# Patient Record
Sex: FEMALE | NOT HISPANIC OR LATINO | URBAN - METROPOLITAN AREA
[De-identification: names, ages, dates, MRNs, and addresses within clinical notes are randomized per-mention and may not be internally consistent; named-entity substitution may affect disease eponyms.]

---

## 2024-01-24 ENCOUNTER — CARE COORDINATION (OUTPATIENT)
Dept: FAMILY MEDICINE | Facility: CLINIC | Age: 19
End: 2024-01-24
Payer: COMMERCIAL

## 2024-01-24 NOTE — PROGRESS NOTES
Late Enty 12- ( before profile was built)    Patient was contacted on 12- to verify phone number and address. Information current above. Patient speaks English. Pt stated that on 12/20/2023 thru middle of January 2024 she will be out of town. She asks that appointments be made for when she gets back to town. Patient has been made aware of the pending appointments. [employer]  offered assistance with transportation. Patient states that she will call the customer service line on [employer] card to request for medical ride.        Today 01-  Writer made phone call to Pt to remind about the initial and result appointments. Pt will work on getting relative to bring her to initial appt. We will work on requesting result appt ride thru [facility] tomorrow.    Refugee Coordination  **Document in each family member's chart & notate transportation in Appt Note**    Initial Screening        Appointment Date: 01/25/2024 8am lab & pcp    Total family members: 1    Appointment & Address confirmed with Patient: Yes      Date Confirmed: 12- see above     Transportation Provider: Pt will have relative bring her  Roundtrip?  Yes    Confirmed with Transportation: Yes    Date Confirmed: 12/19/2023 and 01/24/2024    DHS Worker & Phone number: Adrianne Gia   Date notified: 121/20/2023    -----------------------------------------------------------------------    Result Appointment:    Appointment Date: 02/01/2024 8am  Total family members: 1    Appointment & Address confirmed with Patient: Yes      Date Confirmed: 01/24/2024    Transportation Provider: Writer will teach Pt how to request thru [facility] at 01/25 /24 initial appt. Pt speaks English.    Roundtrip?  Yes  Confirmed with Transportation: Yes    Date Confirmed: 01/24/2024     DHS Worker & Phone number: Adriannekeena Nielsen   Date notified: 121/20/2023

## 2024-01-25 ENCOUNTER — CARE COORDINATION (OUTPATIENT)
Dept: FAMILY MEDICINE | Facility: CLINIC | Age: 19
End: 2024-01-25

## 2024-01-25 ENCOUNTER — OFFICE VISIT (OUTPATIENT)
Dept: FAMILY MEDICINE | Facility: CLINIC | Age: 19
End: 2024-01-25
Payer: COMMERCIAL

## 2024-01-25 VITALS
HEIGHT: 64 IN | SYSTOLIC BLOOD PRESSURE: 117 MMHG | RESPIRATION RATE: 20 BRPM | WEIGHT: 135.8 LBS | BODY MASS INDEX: 23.18 KG/M2 | OXYGEN SATURATION: 96 % | DIASTOLIC BLOOD PRESSURE: 74 MMHG | HEART RATE: 92 BPM | TEMPERATURE: 97.5 F

## 2024-01-25 DIAGNOSIS — R45.89 SADNESS: ICD-10-CM

## 2024-01-25 DIAGNOSIS — Z02.89 REFUGEE HEALTH EXAMINATION: Primary | ICD-10-CM

## 2024-01-25 LAB
ALBUMIN SERPL BCG-MCNC: 4.7 G/DL (ref 3.5–5.2)
ALP SERPL-CCNC: 70 U/L (ref 40–150)
ALT SERPL W P-5'-P-CCNC: 14 U/L (ref 0–50)
ANION GAP SERPL CALCULATED.3IONS-SCNC: 12 MMOL/L (ref 7–15)
AST SERPL W P-5'-P-CCNC: 20 U/L (ref 0–45)
BASOPHILS # BLD AUTO: 0.1 10E3/UL (ref 0–0.2)
BASOPHILS NFR BLD AUTO: 1 %
BILIRUB SERPL-MCNC: 0.8 MG/DL
BUN SERPL-MCNC: 12.4 MG/DL (ref 6–20)
CALCIUM SERPL-MCNC: 9.8 MG/DL (ref 8.6–10)
CHLORIDE SERPL-SCNC: 103 MMOL/L (ref 98–107)
CREAT SERPL-MCNC: 0.81 MG/DL (ref 0.51–0.95)
DEPRECATED HCO3 PLAS-SCNC: 25 MMOL/L (ref 22–29)
EGFRCR SERPLBLD CKD-EPI 2021: >90 ML/MIN/1.73M2
EOSINOPHIL # BLD AUTO: 0.1 10E3/UL (ref 0–0.7)
EOSINOPHIL NFR BLD AUTO: 2 %
ERYTHROCYTE [DISTWIDTH] IN BLOOD BY AUTOMATED COUNT: 11.5 % (ref 10–15)
GLUCOSE SERPL-MCNC: 96 MG/DL (ref 70–99)
HCG UR QL: NEGATIVE
HCT VFR BLD AUTO: 39.8 % (ref 35–47)
HGB BLD-MCNC: 13.9 G/DL (ref 11.7–15.7)
IMM GRANULOCYTES # BLD: 0 10E3/UL
IMM GRANULOCYTES NFR BLD: 0 %
LYMPHOCYTES # BLD AUTO: 2.7 10E3/UL (ref 0.8–5.3)
LYMPHOCYTES NFR BLD AUTO: 34 %
MCH RBC QN AUTO: 31 PG (ref 26.5–33)
MCHC RBC AUTO-ENTMCNC: 34.9 G/DL (ref 31.5–36.5)
MCV RBC AUTO: 89 FL (ref 78–100)
MONOCYTES # BLD AUTO: 0.5 10E3/UL (ref 0–1.3)
MONOCYTES NFR BLD AUTO: 6 %
NEUTROPHILS # BLD AUTO: 4.4 10E3/UL (ref 1.6–8.3)
NEUTROPHILS NFR BLD AUTO: 57 %
NRBC # BLD AUTO: 0 10E3/UL
NRBC BLD AUTO-RTO: 0 /100
PLATELET # BLD AUTO: 235 10E3/UL (ref 150–450)
POTASSIUM SERPL-SCNC: 3.8 MMOL/L (ref 3.4–5.3)
PROT SERPL-MCNC: 7.6 G/DL (ref 6.4–8.3)
RBC # BLD AUTO: 4.49 10E6/UL (ref 3.8–5.2)
SODIUM SERPL-SCNC: 140 MMOL/L (ref 135–145)
T PALLIDUM AB SER QL: NONREACTIVE
VZV IGG SER QL IA: 921.9 INDEX
VZV IGG SER QL IA: POSITIVE
WBC # BLD AUTO: 7.8 10E3/UL (ref 4–11)

## 2024-01-25 PROCEDURE — 87491 CHLMYD TRACH DNA AMP PROBE: CPT

## 2024-01-25 PROCEDURE — 87389 HIV-1 AG W/HIV-1&-2 AB AG IA: CPT

## 2024-01-25 PROCEDURE — 87340 HEPATITIS B SURFACE AG IA: CPT

## 2024-01-25 PROCEDURE — 81025 URINE PREGNANCY TEST: CPT

## 2024-01-25 PROCEDURE — 85025 COMPLETE CBC W/AUTO DIFF WBC: CPT

## 2024-01-25 PROCEDURE — 80053 COMPREHEN METABOLIC PANEL: CPT

## 2024-01-25 PROCEDURE — 86780 TREPONEMA PALLIDUM: CPT

## 2024-01-25 PROCEDURE — 86704 HEP B CORE ANTIBODY TOTAL: CPT

## 2024-01-25 PROCEDURE — 36415 COLL VENOUS BLD VENIPUNCTURE: CPT

## 2024-01-25 PROCEDURE — 99385 PREV VISIT NEW AGE 18-39: CPT | Mod: GC

## 2024-01-25 PROCEDURE — 86682 HELMINTH ANTIBODY: CPT | Mod: 90

## 2024-01-25 PROCEDURE — 99000 SPECIMEN HANDLING OFFICE-LAB: CPT

## 2024-01-25 PROCEDURE — 86706 HEP B SURFACE ANTIBODY: CPT

## 2024-01-25 PROCEDURE — 87591 N.GONORRHOEAE DNA AMP PROB: CPT

## 2024-01-25 PROCEDURE — 86803 HEPATITIS C AB TEST: CPT

## 2024-01-25 PROCEDURE — 86787 VARICELLA-ZOSTER ANTIBODY: CPT

## 2024-01-25 PROCEDURE — 86481 TB AG RESPONSE T-CELL SUSP: CPT

## 2024-01-25 NOTE — PATIENT INSTRUCTIONS
Thank you for coming in to see us today!    - Follow up in 1 week to discuss labs    Matheus Lam, DO

## 2024-01-25 NOTE — PROGRESS NOTES
Preceptor Attestation:   Patient seen, evaluated and discussed with the resident. I have verified the content of the note, which accurately reflects my assessment of the patient and the plan of care.   Supervising Physician:  Chidi Schrader MD
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
## 2024-01-25 NOTE — PROGRESS NOTES
Writer saw Pt today. Writer also welcomed [place] to the [place]. Provide audreyr's business card for [employer]. Writer asked Pt how she is adjusting to the [place]. Pt teared up. Pt reports that she is here alone from [place]. That her family is still there. Pt manages to talk about her wish that some day that she would like to bring her family to be with her. Pt stated that she just does not know how to bring them. Writer offered that when she is ready to work on that piece we can explore that together. Pt is tearful. Writer explained about our behavioral health specialist that can help her if she needs to talk about her feelings and concerns. She will let writer know or make an appointment herself.     Pt pulled herself together and managed a smile and steady eye contact with writer. Pt stated that she is very busy going to college. Pt states that she has obtained her driving permit. She is continuing to work on driving skills. Pt says that for now she will use the bus system to get to places and school. Writer offered Pt a community resource for use in future days. Writer showed Pt how to look up the website for [employer] [facility] where they help with housing, energy assistance, money, jobs and transportation and a car loan program. See link https://[employer]enaissance [employer].org/services/.    We went over the [employer] Customer Service number to request for rides to medical appointments. Pt showed that she has been using that number to obtain informations. Writer confirmed that that is the number to call for ride request. Explained to Pt that [facility] require 2 days or more notice to schedule . Pt states that she will call [facility] for the next appointment.

## 2024-01-25 NOTE — COMMUNITY RESOURCES LIST (ENGLISH)
01/25/2024   [facility]  N/A  For questions about this resource list or additional care needs, please contact your primary care clinic or care manager.  Phone: [phone]   Email: N/A   Address: [address] [place] [id number]   Hours: N/A        Food and Nutrition       Food pantry  1  [facility] Food Shelf - Food Shelf Distance: 1.58 miles      [facility] [place] [address]  Language: English, Congolese  Hours: Mon - Tue 2:00 PM - 7:00 PM , Wed 11:00 AM - 2:00 PM , Fri 11:00 AM - 2:00 PM  Fees: Free   Phone: (391) 661-3142 Email: info@[employer]abi.org Website: http://navabi.org     2  [facility] Resource Center Distance: 3.47 miles      In-Person   [address] [place] [address]  Language: English, Congolese  Hours: Mon - Thu 9:30 AM - 3:00 PM  Fees: Free   Phone: (386) 846-1245 Email: center@[facility] Website: https://www.[facility].[facility]/community-resource-center/     SNAP application assistance  3  [employer] Distance: 9.64 miles      In-Person   [address] [place] [address]  Language: English, Congolese  Hours: Mon - Fri 8:30 AM - 5:00 PM  Fees: Free   Phone: (680) 449-7953 Email: info@clues.org Website: http://www.clues.org     4  [place] Department of Human Services - [employer] (SNAP) Distance: 10.57 miles      Phone/Virtual   [address] [address] [place] [address]  Language: English, Hmong, Angolan, Cymro, Congolese, Syriac  Hours: Mon - Fri 9:00 AM - 5:00 PM  Fees: Free   Phone: (667) 763-6603 Website: https://[place].gov/dhs/people-we-serve/adults/economic-assistance/food-nutrition/programs-and-services/supplemental-nutrition-assistance-program.jsp     Soup kitchen or free meals  5  [facility] - Thursday Night Community Meal Distance: 3.47 miles      In-Person   [address] [place] [address]  Language: English, Hebrew  Hours: Thu 6:00 PM - 7:00 PM  Fees: Free   Phone: (979) 389-4245 Email: center@[employer] Website: https://www.[employer]/[facility]-resource-center/     6  [facility] [employer] - Loaves [employer] Fishes Distance: 7.29 miles      Pickup   [address] [place] [address]  Language: English  Hours: Wed 5:30 PM - 6:30 PM  Fees: Free   Phone: (140) 384-8164 Email: office@[employer] Website: https://www.[facility]          Transportation       Free or low-cost transportation  7  [facility] Bus [facility] - Free or low-cost transportation Distance: 3.2 miles      In-Person   [address] [place] [address]  Language: English  Hours: Mon - Fri 9:00 AM - 5:00 PM  Fees: Free   Phone: (441) 283-2059 Email: info@[employer] Website: https://www.[employer]/     8  [facility] - Free Bus and Gas Cards - Free or low-cost transportation Distance: 4.19 miles      Pickup   [address] [place] [address]  Language: English  Hours: Mon - Fri 9:00 AM - 4:00 PM , Sun 9:30 AM - 12:00 PM  Fees: Free   Phone: (286) 509-1225 Email: jesse@[facility].org Website: http://[facility].org/[facility]/[place]/     Transportation to medical appointments  9  [employer] Ride Distance: 7.65 miles      In-Person   [address] [address]  Language: English  Hours: Mon - Thu 6:00 AM - 6:00 PM , Fri 6:00 AM - 5:00 PM  Fees: Insurance, Self Pay   Phone: (379) 949-5783 Email: office@[employer] Website: https://www.[employer]/     10  [facility] - Non-Emergency Medical Transportation Distance: 11.69 miles      In-Person   [address] [place] [address]  Language: English  Hours: Mon - Fri 8:00 AM - 4:00 PM Appt. Only  Fees: Self Pay   Phone: (458) 398-8880 Website:  http://www.[facility].org/Medical-Services/Emergency-medical-services/Non-emergency-transportation/          Important Numbers & Websites       Emergency Services   911  [facility]   311  Poison Control   (455) 144-1103  Suicide Prevention Lifeline   (541) 122-4772 (TALK)  Child Abuse Hotline   (686) 936-9184 (4-A-Child)  Sexual Assault Hotline   (190) 269-4755 (HOPE)  National Runaway Safeline   (474) 935-9232 (RUNAWAY)  All-Options Talkline   (824) 819-2085  Substance Abuse Referral   (394) 422-6386 (HELP)

## 2024-01-26 LAB
C TRACH DNA SPEC QL NAA+PROBE: NEGATIVE
GAMMA INTERFERON BACKGROUND BLD IA-ACNC: 0.01 IU/ML
HBV CORE AB SERPL QL IA: NONREACTIVE
HBV SURFACE AB SERPL IA-ACNC: <3.5 M[IU]/ML
HBV SURFACE AB SERPL IA-ACNC: NONREACTIVE M[IU]/ML
HBV SURFACE AG SERPL QL IA: NONREACTIVE
HCV AB SERPL QL IA: NONREACTIVE
HIV 1+2 AB+HIV1 P24 AG SERPL QL IA: NONREACTIVE
M TB IFN-G BLD-IMP: NEGATIVE
M TB IFN-G CD4+ BCKGRND COR BLD-ACNC: 9.99 IU/ML
MITOGEN IGNF BCKGRD COR BLD-ACNC: 0.02 IU/ML
MITOGEN IGNF BCKGRD COR BLD-ACNC: 0.02 IU/ML
N GONORRHOEA DNA SPEC QL NAA+PROBE: NEGATIVE
QUANTIFERON MITOGEN: 10 IU/ML
QUANTIFERON NIL TUBE: 0.01 IU/ML
QUANTIFERON TB1 TUBE: 0.03 IU/ML
QUANTIFERON TB2 TUBE: 0.03

## 2024-01-28 LAB — STRONGYLOIDES IGG SER IA-ACNC: 0.2 IV

## 2024-01-30 LAB — SCHISTOSOMA IGG SER IA-ACNC: 4 U

## 2024-02-01 ENCOUNTER — TELEPHONE (OUTPATIENT)
Dept: FAMILY MEDICINE | Facility: CLINIC | Age: 19
End: 2024-02-01

## 2024-02-01 NOTE — TELEPHONE ENCOUNTER
At 8:10am Pt did not show to her 8am Refugee Result appt.Writer called Pt but no answer on the number listed. Writer YAZMIN on  for a call back stating that we can definitely schedule a new appt and writer will be happy to help Pt schedule a ride this next time.

## 2024-02-06 ENCOUNTER — TELEPHONE (OUTPATIENT)
Dept: FAMILY MEDICINE | Facility: CLINIC | Age: 19
End: 2024-02-06
Payer: COMMERCIAL

## 2024-02-06 NOTE — TELEPHONE ENCOUNTER
This writer called Pt to offer her assistance in calling [facility] for ride. Pt declines saying she has someone to drive her to the Feb 8th 2:10pm appointment at [facility]. Emailed Adrianne Nielsen at [facility] to inform of new result appt.

## 2024-02-08 ENCOUNTER — OFFICE VISIT (OUTPATIENT)
Dept: FAMILY MEDICINE | Facility: CLINIC | Age: 19
End: 2024-02-08
Payer: COMMERCIAL

## 2024-02-08 VITALS
HEART RATE: 69 BPM | RESPIRATION RATE: 18 BRPM | WEIGHT: 138.8 LBS | HEIGHT: 64 IN | BODY MASS INDEX: 23.7 KG/M2 | TEMPERATURE: 98.2 F | DIASTOLIC BLOOD PRESSURE: 70 MMHG | SYSTOLIC BLOOD PRESSURE: 109 MMHG | OXYGEN SATURATION: 93 %

## 2024-02-08 DIAGNOSIS — Z02.89 REFUGEE HEALTH EXAMINATION: Primary | ICD-10-CM

## 2024-02-08 DIAGNOSIS — Z30.45 ENCOUNTER FOR SURVEILLANCE OF TRANSDERMAL PATCH HORMONAL CONTRACEPTIVE DEVICE: ICD-10-CM

## 2024-02-08 PROCEDURE — 90471 IMMUNIZATION ADMIN: CPT

## 2024-02-08 PROCEDURE — 90651 9VHPV VACCINE 2/3 DOSE IM: CPT

## 2024-02-08 PROCEDURE — 99213 OFFICE O/P EST LOW 20 MIN: CPT | Mod: 25

## 2024-02-08 RX ORDER — NORELGESTROMIN AND ETHINYL ESTRADIOL 35; 150 UG/MG; UG/MG
1 PATCH TRANSDERMAL
COMMUNITY
Start: 2023-12-08 | End: 2024-02-08

## 2024-02-08 RX ORDER — NORELGESTROMIN AND ETHINYL ESTRADIOL 35; 150 UG/MG; UG/MG
1 PATCH TRANSDERMAL WEEKLY
Qty: 3 PATCH | Refills: 11 | Status: SHIPPED | OUTPATIENT
Start: 2024-02-08

## 2024-02-08 NOTE — PROGRESS NOTES
"Preceptor Attestation:  Vitals:    02/08/24 1412   BP: 109/70   BP Location: Right arm   Patient Position: Sitting   Cuff Size: Adult Regular   Pulse: 69   Resp: 18   Temp: 98.2  F (36.8  C)   TempSrc: Oral   SpO2: 93%   Weight: 63 kg (138 lb 12.8 oz)   Height: 1.618 m (5' 3.7\")          I discussed the patient with the resident and evaluated the patient in person. I have verified the content of the note, which accurately reflects my assessment of the patient and the plan of care.   Supervising Physician:  Deanna Mcgraw MD    "

## 2024-02-08 NOTE — LETTER
[facility] [address] [place] [address]  Phone: [phone]  Fax: [phone]    February 8, 2024        Gayatri Tamayo  [address]   [facility] [address]          To whom it may concern:    RE: Gayatri Tamayo    Here are Gayatri's lab results from 1/25/2024:      Office Visit on 01/25/2024   Component Date Value Ref Range Status    Sodium 01/25/2024 140  135 - 145 mmol/L Final    Potassium 01/25/2024 3.8  3.4 - 5.3 mmol/L Final    Carbon Dioxide (CO2) 01/25/2024 25  22 - 29 mmol/L Final    Anion Gap 01/25/2024 12  7 - 15 mmol/L Final    Urea Nitrogen 01/25/2024 12.4  6.0 - 20.0 mg/dL Final    Creatinine 01/25/2024 0.81  0.51 - 0.95 mg/dL Final    GFR Estimate 01/25/2024 >90  >60 mL/min/1.73m2 Final    Calcium 01/25/2024 9.8  8.6 - 10.0 mg/dL Final    Chloride 01/25/2024 103  98 - 107 mmol/L Final    Glucose 01/25/2024 96  70 - 99 mg/dL Final    Alkaline Phosphatase 01/25/2024 70  40 - 150 U/L Final    AST 01/25/2024 20  0 - 45 U/L Final    ALT 01/25/2024 14  0 - 50 U/L Final    Protein Total 01/25/2024 7.6  6.4 - 8.3 g/dL Final    Albumin 01/25/2024 4.7  3.5 - 5.2 g/dL Final    Bilirubin Total 01/25/2024 0.8  <=1.2 mg/dL Final    Treponema Antibody Total 01/25/2024 Nonreactive  Nonreactive Final    Strongyloides Ramona IgG 01/25/2024 0.2  <=0.9 IV Final    Comment: INTERPRETIVE INFORMATION: Strongyloides Ab, IgG by ISAMAR      0.9 IV or less....... Negative - No significant                          level of Strongyloides IgG                          antibody detected.       1.0 IV................Equivocal - The Strongyloides IgG                           antibody result is borderline and                           therefore inconclusive. Recommend                           retesting the patient in 2-4 weeks,                          if clinically indicated.      1.1 IV or greater ... Positive - IgG antibodies to                          Strongyloides detected, which                           may suggest current or past                          infection.                           Number: [id number]    Schistosoma Antibody IgG 01/25/2024 4  <=8 U Final      Negative - No significant level of Schistosoma IgG antibody   detected.    HIV Antigen Antibody Combo 01/25/2024 Nonreactive  Nonreactive Final    Hepatitis B Core Antibody Total 01/25/2024 Nonreactive  Nonreactive Final    Hepatitis B Surface Antibody 01/25/2024 Nonreactive   Final    Hepatitis B Surface Antibody Instr* 01/25/2024 <3.50  <8.5 m[IU]/mL Final    Hepatitis C Antibody 01/25/2024 Nonreactive  Nonreactive Final    VZV Ramona IgG Instrument Value 01/25/2024 921.9  <135.0 Index Final    Varicella Zoster Antibody IgG 01/25/2024 Positive   Final    Suggests previous exposure or immunization and probable immunity.    Hepatitis B Surface Antigen 01/25/2024 Nonreactive  Nonreactive Final    Neisseria gonorrhoeae 01/25/2024 Negative  Negative Final    Chlamydia trachomatis 01/25/2024 Negative  Negative Final    hCG Urine Qualitative 01/25/2024 Negative  Negative Final    This test is for screening purposes.  Results should be interpreted along with the clinical picture.  Confirmation testing is available if warranted by ordering [id number], HCG Quantitative Pregnancy.    Quantiferon Nil Tube 01/25/2024 0.01  IU/mL Final    Quantiferon TB1 Tube 01/25/2024 0.03  IU/mL Final    Quantiferon TB2 Tube 01/25/2024 0.03   Final    Quantiferon Mitogen 01/25/2024 10.00  IU/mL Final    WBC Count 01/25/2024 7.8  4.0 - 11.0 10e3/uL Final    RBC Count 01/25/2024 4.49  3.80 - 5.20 10e6/uL Final    Hemoglobin 01/25/2024 13.9  11.7 - 15.7 g/dL Final    Hematocrit 01/25/2024 39.8  35.0 - 47.0 % Final    MCV 01/25/2024 89  78 - 100 fL Final    MCH 01/25/2024 31.0  26.5 - 33.0 pg Final    MCHC 01/25/2024 34.9  31.5 - 36.5 g/dL Final    RDW 01/25/2024 11.5  10.0 - 15.0 % Final    Platelet Count 01/25/2024 235  150 - 450 10e3/uL Final    % Neutrophils 01/25/2024  57  % Final    % Lymphocytes 01/25/2024 34  % Final    % Monocytes 01/25/2024 6  % Final    % Eosinophils 01/25/2024 2  % Final    % Basophils 01/25/2024 1  % Final    % Immature Granulocytes 01/25/2024 0  % Final    NRBCs per 100 WBC 01/25/2024 0  <1 /100 Final    Absolute Neutrophils 01/25/2024 4.4  1.6 - 8.3 10e3/uL Final    Absolute Lymphocytes 01/25/2024 2.7  0.8 - 5.3 10e3/uL Final    Absolute Monocytes 01/25/2024 0.5  0.0 - 1.3 10e3/uL Final    Absolute Eosinophils 01/25/2024 0.1  0.0 - 0.7 10e3/uL Final    Absolute Basophils 01/25/2024 0.1  0.0 - 0.2 10e3/uL Final    Absolute Immature Granulocytes 01/25/2024 0.0  <=0.4 10e3/uL Final    Absolute NRBCs 01/25/2024 0.0  10e3/uL Final    Quantiferon-TB Gold Plus 01/25/2024 Negative  Negative Final    No interferon gamma response to M.tuberculosis antigens was detected. Infection with M.tuberculosis is unlikely, however a single negative result does not exclude infection. In patients at high risk for infection, a second test should be considered in accordance with the 2017 ATS/IDSA/CDC Clinical Pract  ice Guidelines for Diagnosis of Tuberculosis in Adults and Children     TB1 Ag minus Nil Value 01/25/2024 0.02  IU/mL Final    TB2 Ag minus Nil Value 01/25/2024 0.02  IU/mL Final    Mitogen minus Nil Result 01/25/2024 9.99  IU/mL Final    Nil Result 01/25/2024 0.01  IU/mL Final       Please contact me for questions or concerns.      Sincerely,            Matheus Lam,

## 2024-02-08 NOTE — PATIENT INSTRUCTIONS
Thank you for coming in to see us today!    - Follow up in 1 year for a complete physical exam    Matheus Lam, DO

## 2024-02-08 NOTE — PROGRESS NOTES
Refugee Screening: Second Visit    Subjective: Feeling well today. No acute concerns.    Labs from Initial Refugee Screening Visit were reviewed       Office Visit on 01/25/2024   Component Date Value Ref Range Status    Sodium 01/25/2024 140  135 - 145 mmol/L Final    Reference intervals for this test were updated on 09/26/2023 to more accurately reflect our healthy population. There may be differences in the flagging of prior results with similar values performed with this method. Interpretation of those prior results can be made in the context of the updated reference intervals.     Potassium 01/25/2024 3.8  3.4 - 5.3 mmol/L Final    Carbon Dioxide (CO2) 01/25/2024 25  22 - 29 mmol/L Final    Anion Gap 01/25/2024 12  7 - 15 mmol/L Final    Urea Nitrogen 01/25/2024 12.4  6.0 - 20.0 mg/dL Final    Creatinine 01/25/2024 0.81  0.51 - 0.95 mg/dL Final    GFR Estimate 01/25/2024 >90  >60 mL/min/1.73m2 Final    Calcium 01/25/2024 9.8  8.6 - 10.0 mg/dL Final    Chloride 01/25/2024 103  98 - 107 mmol/L Final    Glucose 01/25/2024 96  70 - 99 mg/dL Final    Alkaline Phosphatase 01/25/2024 70  40 - 150 U/L Final    Reference intervals for this test were updated on 11/14/2023 to more accurately reflect our healthy population. There may be differences in the flagging of prior results with similar values performed with this method. Interpretation of those prior results can be made in the context of the updated reference intervals.    AST 01/25/2024 20  0 - 45 U/L Final    Reference intervals for this test were updated on 6/12/2023 to more accurately reflect our healthy population. There may be differences in the flagging of prior results with similar values performed with this method. Interpretation of those prior results can be made in the context of the updated reference intervals.    ALT 01/25/2024 14  0 - 50 U/L Final    Reference intervals for this test were updated on 6/12/2023 to more accurately reflect our healthy  population. There may be differences in the flagging of prior results with similar values performed with this method. Interpretation of those prior results can be made in the context of the updated reference intervals.      Protein Total 01/25/2024 7.6  6.4 - 8.3 g/dL Final    Albumin 01/25/2024 4.7  3.5 - 5.2 g/dL Final    Bilirubin Total 01/25/2024 0.8  <=1.2 mg/dL Final    Treponema Antibody Total 01/25/2024 Nonreactive  Nonreactive Final    Strongyloides Ramona IgG 01/25/2024 0.2  <=0.9 IV Final    Comment: INTERPRETIVE INFORMATION: Strongyloides Ab, IgG by ISAMAR      0.9 IV or less....... Negative - No significant                          level of Strongyloides IgG                          antibody detected.       1.0 IV................Equivocal - The Strongyloides IgG                           antibody result is borderline and                           therefore inconclusive. Recommend                           retesting the patient in 2-4 weeks,                          if clinically indicated.      1.1 IV or greater ... Positive - IgG antibodies to                          Strongyloides detected, which                          may suggest current or past                          infection.    False-positive results may occur with prior exposure to   other helminth infections. Testing low-prevalence   populations may also result in false-positive results.  Performed By: [facility]  [address] [place] [address]  : Dilan Zaidi MD, PhD  CLIA                            Number: [id number]    Schistosoma Antibody IgG 01/25/2024 4  <=8 U Final      Negative - No significant level of Schistosoma IgG antibody   detected.  Performed By: [facility]  [address] [place] [address]  : Dilan Zaidi MD, PhD  CLIA Number: [id number]    HIV Antigen Antibody Combo 01/25/2024 Nonreactive  Nonreactive Final    Negative HIV-1/-2 antigen and  antibody screening test results usually indicate the absence of HIV-1 and HIV-2 infection. However, such negative results do not rule-out acute HIV infection.  If acute HIV-1 or HIV-2 infection is suspected, detection of HIV-1 or HIV-2 RNA  is recommended.     Hepatitis B Core Antibody Total 01/25/2024 Nonreactive  Nonreactive Final    Nonreactive hepatitis B core antibody test results indicate the absence of exposure to hepatitis B virus and no evidence of recent, past/resolved, or chronic hepatitis B.     Hepatitis B Surface Antibody 01/25/2024 Nonreactive   Final    Nonreactive results, defined as anti-HBs levels of less than 8.5 mIU/mL, indicate a lack of recovery from acute or chronic hepatitis B or inadequate immune response to HBV vaccination.    Hepatitis B Surface Antibody Instr* 01/25/2024 <3.50  <8.5 m[IU]/mL Final    Hepatitis C Antibody 01/25/2024 Nonreactive  Nonreactive Final    A nonreactive screening test result does not exclude the possibility of exposure to or infection with HCV. Nonreactive screening test results in individuals with prior exposure to HCV may be due to antibody levels below the limit of detection of this assay or lack of reactivity to the HCV antigens used in this assay. Patients with recent HCV infections (<3 months from time of exposure) may have false-negative HCV antibody results due to the time needed for seroconversion (average of 8 to 9 weeks).    VZV Ramona IgG Instrument Value 01/25/2024 921.9  <135.0 Index Final    Varicella Zoster Antibody IgG 01/25/2024 Positive   Final    Suggests previous exposure or immunization and probable immunity.    Hepatitis B Surface Antigen 01/25/2024 Nonreactive  Nonreactive Final    Neisseria gonorrhoeae 01/25/2024 Negative  Negative Final    Negative for N. gonorrhoeae rRNA by transcription mediated amplification. A negative result by transcription mediated amplification does not preclude the presence of C. trachomatis infection because  results are dependent on proper and adequate collection, absence of inhibitors and sufficient rRNA to be detected.    Chlamydia trachomatis 01/25/2024 Negative  Negative Final    A negative result by transcription mediated amplification does not preclude the presence of C. trachomatis infection because results are dependent on proper and adequate collection, absence of inhibitors and sufficient rRNA to be detected.    hCG Urine Qualitative 01/25/2024 Negative  Negative Final    This test is for screening purposes.  Results should be interpreted along with the clinical picture.  Confirmation testing is available if warranted by ordering [id number], HCG Quantitative Pregnancy.    Quantiferon Nil Tube 01/25/2024 0.01  IU/mL Final    Quantiferon TB1 Tube 01/25/2024 0.03  IU/mL Final    Quantiferon TB2 Tube 01/25/2024 0.03   Final    Quantiferon Mitogen 01/25/2024 10.00  IU/mL Final    WBC Count 01/25/2024 7.8  4.0 - 11.0 10e3/uL Final    RBC Count 01/25/2024 4.49  3.80 - 5.20 10e6/uL Final    Hemoglobin 01/25/2024 13.9  11.7 - 15.7 g/dL Final    Hematocrit 01/25/2024 39.8  35.0 - 47.0 % Final    MCV 01/25/2024 89  78 - 100 fL Final    MCH 01/25/2024 31.0  26.5 - 33.0 pg Final    MCHC 01/25/2024 34.9  31.5 - 36.5 g/dL Final    RDW 01/25/2024 11.5  10.0 - 15.0 % Final    Platelet Count 01/25/2024 235  150 - 450 10e3/uL Final    % Neutrophils 01/25/2024 57  % Final    % Lymphocytes 01/25/2024 34  % Final    % Monocytes 01/25/2024 6  % Final    % Eosinophils 01/25/2024 2  % Final    % Basophils 01/25/2024 1  % Final    % Immature Granulocytes 01/25/2024 0  % Final    NRBCs per 100 WBC 01/25/2024 0  <1 /100 Final    Absolute Neutrophils 01/25/2024 4.4  1.6 - 8.3 10e3/uL Final    Absolute Lymphocytes 01/25/2024 2.7  0.8 - 5.3 10e3/uL Final    Absolute Monocytes 01/25/2024 0.5  0.0 - 1.3 10e3/uL Final    Absolute Eosinophils 01/25/2024 0.1  0.0 - 0.7 10e3/uL Final    Absolute Basophils 01/25/2024 0.1  0.0 - 0.2 10e3/uL Final     "Absolute Immature Granulocytes 01/25/2024 0.0  <=0.4 10e3/uL Final    Absolute NRBCs 01/25/2024 0.0  10e3/uL Final    Quantiferon-TB Gold Plus 01/25/2024 Negative  Negative Final    No interferon gamma response to M.tuberculosis antigens was detected. Infection with M.tuberculosis is unlikely, however a single negative result does not exclude infection. In patients at high risk for infection, a second test should be considered in accordance with the 2017 ATS/IDSA/CDC Clinical Pract  ice Guidelines for Diagnosis of Tuberculosis in Adults and Children     TB1 Ag minus Nil Value 01/25/2024 0.02  IU/mL Final    TB2 Ag minus Nil Value 01/25/2024 0.02  IU/mL Final    Mitogen minus Nil Result 01/25/2024 9.99  IU/mL Final    Nil Result 01/25/2024 0.01  IU/mL Final        ROS:  General: No fevers, sleeping well at night  Head: No headache  Neck: No swallowing problems   CV: No chest pain or palpitations  Resp: No shortness of breath.  No cough.  GI: No constipation, or diarrhea, no nausea or vomiting  : No urinary complaint    Objective:  /70 (BP Location: Right arm, Patient Position: Sitting, Cuff Size: Adult Regular)   Pulse 69   Temp 98.2  F (36.8  C) (Oral)   Resp 18   Ht 1.618 m (5' 3.7\")   Wt 63 kg (138 lb 12.8 oz)   LMP 01/22/2024 (Exact Date)   SpO2 93%   BMI 24.05 kg/m      Gen:  Well nourished and in no acute distress  HEENT: nasopharynx pink and moist; oropharynx pink and moist  Neck: supple without lymphadenopathy  CV:  regular rate and rhythm - no murmurs, rubs, or tanya  Pulm:  clear to auscultation bilaterally, no wheezes/rales/rhonchi, good air entry   ABD: soft, nontender  Extrem: no cyanosis, edema or clubbing;   Psych: Euthymic     Assessment/Plan:  Refugee health examination    Surveillance of transdermal contraception  - Sent birth control patches to a different pharmacy at patient's request    1) Abnormal lab results: None    2) Abnormal parasite results and treatment plant: None    3) " TB: TB Quant result: Negative    4) Immunizations:  Influenza recommended but declined  HPV administered    5) Referrals: No     6) Follow up plan: Return to clinic for annual physical in 1 year    We discussed having a visit with a dentist to establish regular dental care: patient has already seen one  We discussed yearly visits with a primary care physician for preventative health care.      Matheus Lam, DO

## 2024-02-08 NOTE — PROGRESS NOTES
Prior to immunization administration, verified patients identity using patient s name and date of birth. Please see Immunization Activity for additional information.     Screening Questionnaire for Adult Immunization    Are you sick today?   No   Do you have allergies to medications, food, a vaccine component or latex?   No   Have you ever had a serious reaction after receiving a vaccination?   No   Do you have a long-term health problem with heart, lung, kidney, or metabolic disease (e.g., diabetes), asthma, a blood disorder, no spleen, complement component deficiency, a cochlear implant, or a spinal fluid leak?  Are you on long-term aspirin therapy?   No   Do you have cancer, leukemia, HIV/AIDS, or any other immune system problem?   No   Do you have a parent, brother, or sister with an immune system problem?   No   In the past 3 months, have you taken medications that affect  your immune system, such as prednisone, other steroids, or anticancer drugs; drugs for the treatment of rheumatoid arthritis, Crohn s disease, or psoriasis; or have you had radiation treatments?   No   Have you had a seizure, or a brain or other nervous system problem?   No   During the past year, have you received a transfusion of blood or blood    products, or been given immune (gamma) globulin or antiviral drug?   No   For women: Are you pregnant or is there a chance you could become       pregnant during the next month?   No   Have you received any vaccinations in the past 4 weeks?   No     Immunization questionnaire answers were all negative.      Patient instructed to remain in clinic for 15 minutes afterwards, and to report any adverse reactions.     Screening performed by Karina Narvaez MA on 2/8/2024 at 2:57 PM.